# Patient Record
(demographics unavailable — no encounter records)

---

## 2024-11-18 NOTE — CONSULT LETTER
[Dear  ___] : Dear  [unfilled], [Courtesy Letter:] : I had the pleasure of seeing your patient, [unfilled], in my office today. [Please see my note below.] : Please see my note below. [Consult Closing:] : Thank you very much for allowing me to participate in the care of this patient.  If you have any questions, please do not hesitate to contact me. [Sincerely,] : Sincerely, [FreeTextEntry3] : Nas Cartagena MD [facility]

## 2024-11-18 NOTE — HISTORY OF PRESENT ILLNESS
[FreeTextEntry1] : 11/2024 Interval History: Developed a cold end of October and was sent home from school. Seen by PCP and did not have pneumonia. Reportedly had a drop in his oxygen saturation which improved with albuterol. SpO2 was normal at PCP. PCP recommended Flovent every day for one week. - Started Flovent last night for a cough. Recent ER visits/hospitalizations: denies Last oral steroid course: denies Recurrent cough or wheeze: cough some mornings - coughs up mucus Recurrent nocturnal cough or wheeze: few nights per week - described as dry Activity-induced symptoms: denies with pre-treatment with Flovent Daily meds: denies Last used rescue: last week Snores nightly and loud, no witnessed apneas.  8/2024 - initial visit AMBER YORK is a 5 year M presenting for evaluation of asthma. Currently with cough x2-3 days. +congestion. No fevers, no rhinorrhea. Two weeks ago he developed a cough overnight and vomited up mucus and then resolved that night. Previously followed by Dr. Bermudez from Peds Pulmonary at [facility]. Had recurrent cough when he was younger and diagnosed with recurrent croup. [facility] [place] [facility]. Seen in the ER when younger for illness and noted to have fluid in lungs. Currently on Flovent 110 2p BID with spacer with pretty good adherence. [facility] [place] preferred Asmanex over Fluticasone Propionate HFA (generic Flovent).  Prior hospitalizations: denies Courses of oral corticosteroids: 4-5x for respiratory issues Last ER visit: 6 months ago  Respiratory Symptoms Chronic/Persistent daytime cough: denies when well Chronic/Persistent nighttime cough: denies when well Wheezing: yes Albuterol use: 2-3x per week while in school. Currently only using albuterol before soccer camp. Albuterol response: Good Cough and wheezing responsive to oral corticosteroids: yes Activity limitation: +cough, dyspnea   Modified Asthma Predictive Index 4 or more wheezing episodes/year in the first three years of life: Major risk factors   Parental asthma: Dad   History of eczema: yes   Aeroallergen sensitization: +cat, +?grass - seen previously by allergy, will repeat testing when older Minor risk factors   Food allergies: denies   Wheezing apart from colds:   Eosinophilia > 4%:   Respiratory morbidity History of prior RSV infection: denies History of prior COVID-19 infection: denies History of pneumonia: denies History of sinusitis: denies History of recurrent ear infections: denies Family history of CF, PCD, or other diseases of childhood: denies   Other comorbidities DAISY Symptoms: +snoring, no witnessed apneas GERD: No history of spitting up, regurgitation of feeds, back arching, chest discomfort with feeds. No history of medical treatment for reflux. Dysphagia: No history of choking or gagging with feeds.   Birth history: 39 weeks, NICU for monitoring (small for age, temp instability, colic), no respiratory issues Smokers in household: denies Grade: Going into 1st grade Immunizations: UTD

## 2024-11-18 NOTE — CONSULT LETTER
[Dear  ___] : Dear  [unfilled], [Courtesy Letter:] : I had the pleasure of seeing your patient, [unfilled], in my office today. [Please see my note below.] : Please see my note below. [Consult Closing:] : Thank you very much for allowing me to participate in the care of this patient.  If you have any questions, please do not hesitate to contact me. [Sincerely,] : Sincerely, [FreeTextEntry3] : Nas Cartagena MD [facility]

## 2024-11-18 NOTE — REVIEW OF SYSTEMS
[Nl] : Endocrine [Snoring] : snoring [Wheezing] : wheezing [Cough] : cough [Eczema] : eczema [Urticaria] : urticaria [Immunizations are up to date] : Immunizations are up to date [Apnea] : no apnea [Sinus Problems] : no sinus problems [Recurrent Ear Infections] : no recurrent ear infections [Pneumonia] : no pneumonia [de-identified] : see HPI

## 2024-11-18 NOTE — ASSESSMENT
[FreeTextEntry1] : AMBER YORK is a 5yo M with previously diagnosed asthma presenting for follow up evaluation. Risk factors for asthma include FH asthma (father), atopic dermatitis, and concerns for aeroallergen sensitization. Previously noted cough and wheeze responsive to bronchodilators and oral corticosteroids. Previously initiated onto maintenance anti-inflammatory therapy however discontinued. Currently with recurrent viral infections since school started and with recurrent cough, several nights per week and described as dry/nonproductive. History of prior ER visits and several courses of oral corticosteroids, however none since last visit. Reiterated need to restart maintenance therapy and will plan to start budesonide (previously noted to be the cheapest option as opposed to MDI with spacer, preferred by parents). Encouraged early initiation of albuterol as needed for symptoms. Lungs are clear in office with good aeration.  Patient has history of nightly snoring in addition to tonsillar hypertrophy. Concerns for DAISY in father. Will plan for diagnostic sleep study.  The patient has clinical findings consistent with rhinitis and an intranasal steroid and/or an antihistamine may be helpful in controlling symptoms associated with a post-nasal drip and upper airway cough syndrome. I suggested an allergy evaluation to help determine his allergic triggers. Aggressive control of airway inflammation secondary to allergies would help achieve optimal asthma control. Has upcoming allergy testing (immunocap testing) this spring.   Based on the above assessment, my recommendations are as follows: 1. Take 0.50 mg of budesonide via nebulization twice daily. Rinse mouth out afterwards. 2. Start albuterol, 2-4 puffs via spacer (or 1 vial via nebulization) every 4 - 6 hours as needed for cough, wheeze or difficulty breathing. 3. At the first sign of an illness, start albuterol 2-3x per day and increase as needed as per above. 4. Take 2 puffs of albuterol 15-30 minutes before exercise via a spacer +/- mask only as needed.  5. Will plan for a baseline sleep study. 6. Would consider initiation of Singulair if bronchial inflammation/asthma is not well controlled. 7. Consider initiation of OTC antihistamines (i.e., zyrtec, claritin, allegra) +/- flonase 1 spray in each nostril. Stop antihistamines 7 days before allergy testing. 8. Return to clinic in 3 months, or sooner as needed.  Discussed above assessment and management plan. Parent agreed with plan. All queries were answered.

## 2024-11-18 NOTE — REVIEW OF SYSTEMS
[Nl] : Endocrine [Snoring] : snoring [Wheezing] : wheezing [Cough] : cough [Eczema] : eczema [Urticaria] : urticaria [Immunizations are up to date] : Immunizations are up to date [Apnea] : no apnea [Sinus Problems] : no sinus problems [Recurrent Ear Infections] : no recurrent ear infections [Pneumonia] : no pneumonia [de-identified] : see HPI

## 2024-11-18 NOTE — REVIEW OF SYSTEMS
[Nl] : Endocrine [Snoring] : snoring [Wheezing] : wheezing [Cough] : cough [Eczema] : eczema [Urticaria] : urticaria [Immunizations are up to date] : Immunizations are up to date [Apnea] : no apnea [Sinus Problems] : no sinus problems [Recurrent Ear Infections] : no recurrent ear infections [Pneumonia] : no pneumonia [de-identified] : see HPI

## 2024-11-18 NOTE — CONSULT LETTER
[Dear  ___] : Dear  [unfilled], [Courtesy Letter:] : I had the pleasure of seeing your patient, [unfilled], in my office today. [Please see my note below.] : Please see my note below. [Consult Closing:] : Thank you very much for allowing me to participate in the care of this patient.  If you have any questions, please do not hesitate to contact me. [Sincerely,] : Sincerely, [FreeTextEntry3] : Nas Cartagena MD [facility]

## 2024-11-18 NOTE — IMPRESSION
[Spirometry] : Spirometry [FreeTextEntry1] : Unable to interpret due to poor technique/coordination.

## 2024-11-18 NOTE — REVIEW OF SYSTEMS
[Nl] : Endocrine [Snoring] : snoring [Wheezing] : wheezing [Cough] : cough [Eczema] : eczema [Urticaria] : urticaria [Immunizations are up to date] : Immunizations are up to date [Apnea] : no apnea [Sinus Problems] : no sinus problems [Recurrent Ear Infections] : no recurrent ear infections [Pneumonia] : no pneumonia [de-identified] : see HPI

## 2024-11-18 NOTE — HISTORY OF PRESENT ILLNESS
[FreeTextEntry1] : 11/2024 Interval History: Developed a cold end of October and was sent home from school. Seen by PCP and did not have pneumonia. Reportedly had a drop in his oxygen saturation which improved with albuterol. SpO2 was normal at PCP. PCP recommended Flovent every day for one week. - Started Flovent last night for a cough. Recent ER visits/hospitalizations: denies Last oral steroid course: denies Recurrent cough or wheeze: cough some mornings - coughs up mucus Recurrent nocturnal cough or wheeze: few nights per week - described as dry Activity-induced symptoms: denies with pre-treatment with Flovent Daily meds: denies Last used rescue: last week Snores nightly and loud, no witnessed apneas.  8/2024 - initial visit AMBER YORK is a 5 year M presenting for evaluation of asthma. Currently with cough x2-3 days. +congestion. No fevers, no rhinorrhea. Two weeks ago he developed a cough overnight and vomited up mucus and then resolved that night. Previously followed by Dr. Bermudez from Peds Pulmonary at [facility]. Had recurrent cough when he was younger and diagnosed with recurrent croup. [facility] [place] so transitioning to [facility]. Seen in the ER when younger for illness and noted to have fluid in lungs. Currently on Flovent 110 2p BID with spacer with pretty good adherence. [facility] [place] insurance has preferred Asmanex over Fluticasone Propionate HFA (generic Flovent).  Prior hospitalizations: denies Courses of oral corticosteroids: 4-5x for respiratory issues Last ER visit: 6 months ago  Respiratory Symptoms Chronic/Persistent daytime cough: denies when well Chronic/Persistent nighttime cough: denies when well Wheezing: yes Albuterol use: 2-3x per week while in school. Currently only using albuterol before soccer camp. Albuterol response: Good Cough and wheezing responsive to oral corticosteroids: yes Activity limitation: +cough, dyspnea   Modified Asthma Predictive Index 4 or more wheezing episodes/year in the first three years of life: Major risk factors   Parental asthma: Dad   History of eczema: yes   Aeroallergen sensitization: +cat, +?grass - seen previously by allergy, will repeat testing when older Minor risk factors   Food allergies: denies   Wheezing apart from colds:   Eosinophilia > 4%:   Respiratory morbidity History of prior RSV infection: denies History of prior COVID-19 infection: denies History of pneumonia: denies History of sinusitis: denies History of recurrent ear infections: denies Family history of CF, PCD, or other diseases of childhood: denies   Other comorbidities DAISY Symptoms: +snoring, no witnessed apneas GERD: No history of spitting up, regurgitation of feeds, back arching, chest discomfort with feeds. No history of medical treatment for reflux. Dysphagia: No history of choking or gagging with feeds.   Birth history: 39 weeks, NICU for monitoring (small for age, temp instability, colic), no respiratory issues Smokers in household: denies Grade: Going into 1st grade Immunizations: UTD

## 2024-11-18 NOTE — HISTORY OF PRESENT ILLNESS
[FreeTextEntry1] : 11/2024 Interval History: Developed a cold end of October and was sent home from school. Seen by PCP and did not have pneumonia. Reportedly had a drop in his oxygen saturation which improved with albuterol. SpO2 was normal at PCP. PCP recommended Flovent every day for one week. - Started Flovent last night for a cough. Recent ER visits/hospitalizations: denies Last oral steroid course: denies Recurrent cough or wheeze: cough some mornings - coughs up mucus Recurrent nocturnal cough or wheeze: few nights per week - described as dry Activity-induced symptoms: denies with pre-treatment with Flovent Daily meds: denies Last used rescue: last week Snores nightly and loud, no witnessed apneas.  8/2024 - initial visit AMBER YORK is a 5 year M presenting for evaluation of asthma. Currently with cough x2-3 days. +congestion. No fevers, no rhinorrhea. Two weeks ago he developed a cough overnight and vomited up mucus and then resolved that night. Previously followed by Dr. Bermudez from Peds Pulmonary at [facility]. Had recurrent cough when he was younger and diagnosed with recurrent croup. [facility] [place] [facility]. Seen in the ER when younger for illness and noted to have fluid in lungs. Currently on Flovent 110 2p BID with spacer with pretty good adherence. [facility] [place] preferred Asmanex over Fluticasone Propionate HFA (generic Flovent).  Prior hospitalizations: denies Courses of oral corticosteroids: 4-5x for respiratory issues Last ER visit: 6 months ago  Respiratory Symptoms Chronic/Persistent daytime cough: denies when well Chronic/Persistent nighttime cough: denies when well Wheezing: yes Albuterol use: 2-3x per week while in school. Currently only using albuterol before soccer camp. Albuterol response: Good Cough and wheezing responsive to oral corticosteroids: yes Activity limitation: +cough, dyspnea   Modified Asthma Predictive Index 4 or more wheezing episodes/year in the first three years of life: Major risk factors   Parental asthma: Dad   History of eczema: yes   Aeroallergen sensitization: +cat, +?grass - seen previously by allergy, will repeat testing when older Minor risk factors   Food allergies: denies   Wheezing apart from colds:   Eosinophilia > 4%:   Respiratory morbidity History of prior RSV infection: denies History of prior COVID-19 infection: denies History of pneumonia: denies History of sinusitis: denies History of recurrent ear infections: denies Family history of CF, PCD, or other diseases of childhood: denies   Other comorbidities DAISY Symptoms: +snoring, no witnessed apneas GERD: No history of spitting up, regurgitation of feeds, back arching, chest discomfort with feeds. No history of medical treatment for reflux. Dysphagia: No history of choking or gagging with feeds.   Birth history: 39 weeks, NICU for monitoring (small for age, temp instability, colic), no respiratory issues Smokers in household: denies Grade: Going into 1st grade Immunizations: UTD

## 2024-11-18 NOTE — CONSULT LETTER
[Dear  ___] : Dear  [unfilled], [Courtesy Letter:] : I had the pleasure of seeing your patient, [unfilled], in my office today. [Please see my note below.] : Please see my note below. [Consult Closing:] : Thank you very much for allowing me to participate in the care of this patient.  If you have any questions, please do not hesitate to contact me. [Sincerely,] : Sincerely, [FreeTextEntry3] : Nas Cartagena MD [facility]

## 2024-11-18 NOTE — CONSULT LETTER
[Dear  ___] : Dear  [unfilled], [Courtesy Letter:] : I had the pleasure of seeing your patient, [unfilled], in my office today. [Please see my note below.] : Please see my note below. [Consult Closing:] : Thank you very much for allowing me to participate in the care of this patient.  If you have any questions, please do not hesitate to contact me. [Sincerely,] : Sincerely, [FreeTextEntry3] : Nas Cartagena MD [facility]

## 2024-11-18 NOTE — HISTORY OF PRESENT ILLNESS
[FreeTextEntry1] : 11/2024 Interval History: Developed a cold end of October and was sent home from school. Seen by PCP and did not have pneumonia. Reportedly had a drop in his oxygen saturation which improved with albuterol. SpO2 was normal at PCP. PCP recommended Flovent every day for one week. - Started Flovent last night for a cough. Recent ER visits/hospitalizations: denies Last oral steroid course: denies Recurrent cough or wheeze: cough some mornings - coughs up mucus Recurrent nocturnal cough or wheeze: few nights per week - described as dry Activity-induced symptoms: denies with pre-treatment with Flovent Daily meds: denies Last used rescue: last week Snores nightly and loud, no witnessed apneas.  8/2024 - initial visit AMBER YORK is a 5 year M presenting for evaluation of asthma. Currently with cough x2-3 days. +congestion. No fevers, no rhinorrhea. Two weeks ago he developed a cough overnight and vomited up mucus and then resolved that night. Previously followed by Dr. Bermudez from Peds Pulmonary at [facility]. Had recurrent cough when he was younger and diagnosed with recurrent croup. [facility] [place] so [facility]ing to [facility]. Seen in the ER when younger for illness and noted to have fluid in lungs. Currently on Flovent 110 2p BID with spacer with pretty good adherence. [facility] [place] insurance has preferred Asmanex over Fluticasone Propionate HFA (generic Flovent).  Prior hospitalizations: denies Courses of oral corticosteroids: 4-5x for respiratory issues Last ER visit: 6 months ago  Respiratory Symptoms Chronic/Persistent daytime cough: denies when well Chronic/Persistent nighttime cough: denies when well Wheezing: yes Albuterol use: 2-3x per week while in school. Currently only using albuterol before soccer camp. Albuterol response: Good Cough and wheezing responsive to oral corticosteroids: yes Activity limitation: +cough, dyspnea   Modified Asthma Predictive Index 4 or more wheezing episodes/year in the first three years of life: Major risk factors   Parental asthma: Dad   History of eczema: yes   Aeroallergen sensitization: +cat, +?grass - seen previously by allergy, will repeat testing when older Minor risk factors   Food allergies: denies   Wheezing apart from colds:   Eosinophilia > 4%:   Respiratory morbidity History of prior RSV infection: denies History of prior COVID-19 infection: denies History of pneumonia: denies History of sinusitis: denies History of recurrent ear infections: denies Family history of CF, PCD, or other diseases of childhood: denies   Other comorbidities DAISY Symptoms: +snoring, no witnessed apneas GERD: No history of spitting up, regurgitation of feeds, back arching, chest discomfort with feeds. No history of medical treatment for reflux. Dysphagia: No history of choking or gagging with feeds.   Birth history: 39 weeks, NICU for monitoring (small for age, temp instability, colic), no respiratory issues Smokers in household: denies Grade: Going into 1st grade Immunizations: UTD

## 2024-11-18 NOTE — REVIEW OF SYSTEMS
[Nl] : Endocrine [Snoring] : snoring [Wheezing] : wheezing [Cough] : cough [Eczema] : eczema [Urticaria] : urticaria [Immunizations are up to date] : Immunizations are up to date [Apnea] : no apnea [Sinus Problems] : no sinus problems [Recurrent Ear Infections] : no recurrent ear infections [Pneumonia] : no pneumonia [de-identified] : see HPI

## 2024-11-18 NOTE — HISTORY OF PRESENT ILLNESS
[FreeTextEntry1] : 11/2024 Interval History: Developed a cold end of October and was sent home from school. Seen by PCP and did not have pneumonia. Reportedly had a drop in his oxygen saturation which improved with albuterol. SpO2 was normal at PCP. PCP recommended Flovent every day for one week. - Started Flovent last night for a cough. Recent ER visits/hospitalizations: denies Last oral steroid course: denies Recurrent cough or wheeze: cough some mornings - coughs up mucus Recurrent nocturnal cough or wheeze: few nights per week - described as dry Activity-induced symptoms: denies with pre-treatment with Flovent Daily meds: denies Last used rescue: last week Snores nightly and loud, no witnessed apneas.  8/2024 - initial visit AMBER YORK is a 5 year M presenting for evaluation of asthma. Currently with cough x2-3 days. +congestion. No fevers, no rhinorrhea. Two weeks ago he developed a cough overnight and vomited up mucus and then resolved that night. Previously followed by Dr. Bermudez from Peds Pulmonary at [facility]. Had recurrent cough when he was younger and diagnosed with recurrent croup. [facility] [place]urances so transitioning to [facility]. Seen in the ER when younger for illness and noted to have fluid in lungs. Currently on Flovent 110 2p BID with spacer with pretty good adherence. [facility] states insurance has preferred Asmanex over Fluticasone Propionate HFA (generic Flovent).  Prior hospitalizations: denies Courses of oral corticosteroids: 4-5x for respiratory issues Last ER visit: 6 months ago  Respiratory Symptoms Chronic/Persistent daytime cough: denies when well Chronic/Persistent nighttime cough: denies when well Wheezing: yes Albuterol use: 2-3x per week while in school. Currently only using albuterol before soccer camp. Albuterol response: Good Cough and wheezing responsive to oral corticosteroids: yes Activity limitation: +cough, dyspnea   Modified Asthma Predictive Index 4 or more wheezing episodes/year in the first three years of life: Major risk factors   Parental asthma: Dad   History of eczema: yes   Aeroallergen sensitization: +cat, +?grass - seen previously by allergy, will repeat testing when older Minor risk factors   Food allergies: denies   Wheezing apart from colds:   Eosinophilia > 4%:   Respiratory morbidity History of prior RSV infection: denies History of prior COVID-19 infection: denies History of pneumonia: denies History of sinusitis: denies History of recurrent ear infections: denies Family history of CF, PCD, or other diseases of childhood: denies   Other comorbidities DAISY Symptoms: +snoring, no witnessed apneas GERD: No history of spitting up, regurgitation of feeds, back arching, chest discomfort with feeds. No history of medical treatment for reflux. Dysphagia: No history of choking or gagging with feeds.   Birth history: 39 weeks, NICU for monitoring (small for age, temp instability, colic), no respiratory issues Smokers in household: denies Grade: Going into 1st grade Immunizations: UTD

## 2024-11-18 NOTE — PHYSICAL EXAM
[Well Nourished] : well nourished [Well Developed] : well developed [Alert] : ~L alert [Active] : active [Normal Breathing Pattern] : normal breathing pattern [No Respiratory Distress] : no respiratory distress [No Drainage] : no drainage [No Conjunctivitis] : no conjunctivitis [Tympanic Membranes Clear] : tympanic membranes were clear [No Nasal Drainage] : no nasal drainage [No Polyps] : no polyps [No Sinus Tenderness] : no sinus tenderness [No Oral Pallor] : no oral pallor [No Oral Cyanosis] : no oral cyanosis [Non-Erythematous] : non-erythematous [No Exudates] : no exudates [No Postnasal Drip] : no postnasal drip [No Tonsillar Enlargement] : no tonsillar enlargement [Absence Of Retractions] : absence of retractions [Symmetric] : symmetric [Good Expansion] : good expansion [No Acc Muscle Use] : no accessory muscle use [Equal Breath Sounds] : equal breath sounds bilaterally [No Crackles] : no crackles [No Rhonchi] : no rhonchi [No Wheezing] : no wheezing [Normal Sinus Rhythm] : normal sinus rhythm [No Heart Murmur] : no heart murmur [Soft, Non-Tender] : soft, non-tender [Non Distended] : was not ~L distended [Full ROM] : full range of motion [No Clubbing] : no clubbing [Capillary Refill < 2 secs] : capillary refill less than two seconds [No Cyanosis] : no cyanosis [No Kyphoscoliosis] : no kyphoscoliosis [No Contractures] : no contractures [Alert and  Oriented] : alert and oriented [No Abnormal Focal Findings] : no abnormal focal findings [Normal Muscle Tone And Reflexes] : normal muscle tone and reflexes [No Rashes] : no rashes [FreeTextEntry2] : +allergic shiners [FreeTextEntry7] : Mildly diminished aeration to bases [Good aeration to bases] : good aeration to bases [FreeTextEntry3] : external exam normal [FreeTextEntry4] : +edematous nasal turbinates [FreeTextEntry5] : +tonsillar hypertrophy

## 2024-11-18 NOTE — ASSESSMENT
[FreeTextEntry1] : AMBER YORK is a 7yo M with previously diagnosed asthma presenting for follow up evaluation. Risk factors for asthma include FH asthma (father), atopic dermatitis, and concerns for aeroallergen sensitization. Previously noted cough and wheeze responsive to bronchodilators and oral corticosteroids. Previously initiated onto maintenance anti-inflammatory therapy however discontinued. Currently with recurrent viral infections since school started and with recurrent cough, several nights per week and described as dry/nonproductive. History of prior ER visits and several courses of oral corticosteroids, however none since last visit. Reiterated need to restart maintenance therapy and will plan to start budesonide (previously noted to be the cheapest option as opposed to MDI with spacer, preferred by parents). Encouraged early initiation of albuterol as needed for symptoms. Lungs are clear in office with good aeration.  Patient has history of nightly snoring in addition to tonsillar hypertrophy. Concerns for DAISY in father. Will plan for diagnostic sleep study.  The patient has clinical findings consistent with rhinitis and an intranasal steroid and/or an antihistamine may be helpful in controlling symptoms associated with a post-nasal drip and upper airway cough syndrome. I suggested an allergy evaluation to help determine his allergic triggers. Aggressive control of airway inflammation secondary to allergies would help achieve optimal asthma control. Has upcoming allergy testing (immunocap testing) this spring.   Based on the above assessment, my recommendations are as follows: 1. Take 0.50 mg of budesonide via nebulization twice daily. Rinse mouth out afterwards. 2. Start albuterol, 2-4 puffs via spacer (or 1 vial via nebulization) every 4 - 6 hours as needed for cough, wheeze or difficulty breathing. 3. At the first sign of an illness, start albuterol 2-3x per day and increase as needed as per above. 4. Take 2 puffs of albuterol 15-30 minutes before exercise via a spacer +/- mask only as needed.  5. Will plan for a baseline sleep study. 6. Would consider initiation of Singulair if bronchial inflammation/asthma is not well controlled. 7. Consider initiation of OTC antihistamines (i.e., zyrtec, claritin, allegra) +/- flonase 1 spray in each nostril. Stop antihistamines 7 days before allergy testing. 8. Return to clinic in 3 months, or sooner as needed.  Discussed above assessment and management plan. Parent agreed with plan. All queries were answered.

## 2025-02-22 NOTE — REVIEW OF SYSTEMS
[Nl] : Endocrine [Snoring] : snoring [Wheezing] : wheezing [Cough] : cough [Eczema] : eczema [Urticaria] : urticaria [Immunizations are up to date] : Immunizations are up to date [Apnea] : no apnea [Sinus Problems] : no sinus problems [Recurrent Ear Infections] : no recurrent ear infections [Pneumonia] : no pneumonia [de-identified] : see HPI

## 2025-02-22 NOTE — HISTORY OF PRESENT ILLNESS
[FreeTextEntry1] : 2/2025 Interval History: Doing okay. Recent PSG with very mild DAISY - 1.3 events/hr, SpO2 lora 91%, no hypoventilation. +mouth breather? nightly, loud snoring. Reports he constantly sucks his tongue so parents concerned his tongue may be getting in the way. Recent ER visits/hospitalizations: denies Last oral steroid course: denies Recurrent cough or wheeze: denies when well Recurrent nocturnal cough or wheeze: denies when well Activity-induced symptoms: denies Daily meds: budesonide 0.5 BID, albuterol prn Last used rescue: not in a while Will be getting allergy testing in the spring (blood work) - prior history of cat allergy and on zyrtec PRN Going to see a cardiologist next week with brother given FH of murmur.  11/2024 Interval History: Developed a cold end of October and was sent home from school. Seen by PCP and did not have pneumonia. Reportedly had a drop in his oxygen saturation which improved with albuterol. SpO2 was normal at PCP. PCP recommended Flovent every day for one week. - Started Flovent last night for a cough. Recent ER visits/hospitalizations: denies Last oral steroid course: denies Recurrent cough or wheeze: cough some mornings - coughs up mucus Recurrent nocturnal cough or wheeze: few nights per week - described as dry Activity-induced symptoms: denies with pre-treatment with Flovent Daily meds: denies Last used rescue: last week Snores nightly and loud, no witnessed apneas.  8/2024 - initial visit AMBER YORK is a 5 year M presenting for evaluation of asthma. Currently with cough x2-3 days. +congestion. No fevers, no rhinorrhea. Two weeks ago he developed a cough overnight and vomited up mucus and then resolved that night. Previously followed by Dr. Bermudez from Peds Pulmonary at [facility]. Had recurrent cough when he was younger and diagnosed with recurrent croup. [facility] changed insurances so transitioning to [facility]. Seen in the ER when younger for illness and noted to have fluid in lungs. Currently on Flovent 110 2p BID with spacer with pretty good adherence. [facility] states insurance has preferred Asmanex over Fluticasone Propionate HFA (generic Flovent).  Prior hospitalizations: denies Courses of oral corticosteroids: 4-5x for respiratory issues Last ER visit: 6 months ago  Respiratory Symptoms Chronic/Persistent daytime cough: denies when well Chronic/Persistent nighttime cough: denies when well Wheezing: yes Albuterol use: 2-3x per week while in school. Currently only using albuterol before soccer camp. Albuterol response: Good Cough and wheezing responsive to oral corticosteroids: yes Activity limitation: +cough, dyspnea   Modified Asthma Predictive Index 4 or more wheezing episodes/year in the first three years of life: Major risk factors   Parental asthma: Dad   History of eczema: yes   Aeroallergen sensitization: +cat, +?grass - seen previously by allergy, will repeat testing when older Minor risk factors   Food allergies: denies   Wheezing apart from colds:   Eosinophilia > 4%:   Respiratory morbidity History of prior RSV infection: denies History of prior COVID-19 infection: denies History of pneumonia: denies History of sinusitis: denies History of recurrent ear infections: denies Family history of CF, PCD, or other diseases of childhood: denies   Other comorbidities DAISY Symptoms: +snoring, no witnessed apneas GERD: No history of spitting up, regurgitation of feeds, back arching, chest discomfort with feeds. No history of medical treatment for reflux. Dysphagia: No history of choking or gagging with feeds.   Birth history: 39 weeks, NICU for monitoring (small for age, temp instability, colic), no respiratory issues Smokers in household: denies Grade: Going into 1st grade Immunizations: UTD

## 2025-02-22 NOTE — SOCIAL HISTORY
Order CBC, CMP, lipids, TSH (med monitor). Fasting.   [Smokers in Household] : there are no smokers in the home

## 2025-02-22 NOTE — HISTORY OF PRESENT ILLNESS
[FreeTextEntry1] : 2/2025 Interval History: Doing okay. Recent PSG with very mild DAISY - 1.3 events/hr, SpO2 lora 91%, no hypoventilation. +mouth breather? nightly, loud snoring. Reports he constantly sucks his tongue so parents concerned his tongue may be getting in the way. Recent ER visits/hospitalizations: denies Last oral steroid course: denies Recurrent cough or wheeze: denies when well Recurrent nocturnal cough or wheeze: denies when well Activity-induced symptoms: denies Daily meds: budesonide 0.5 BID, albuterol prn Last used rescue: not in a while Will be getting allergy testing in the spring (blood work) - prior history of cat allergy and on zyrtec PRN Going to see a cardiologist next week with brother given FH of murmur.  11/2024 Interval History: Developed a cold end of October and was sent home from school. Seen by PCP and did not have pneumonia. Reportedly had a drop in his oxygen saturation which improved with albuterol. SpO2 was normal at PCP. PCP recommended Flovent every day for one week. - Started Flovent last night for a cough. Recent ER visits/hospitalizations: denies Last oral steroid course: denies Recurrent cough or wheeze: cough some mornings - coughs up mucus Recurrent nocturnal cough or wheeze: few nights per week - described as dry Activity-induced symptoms: denies with pre-treatment with Flovent Daily meds: denies Last used rescue: last week Snores nightly and loud, no witnessed apneas.  8/2024 - initial visit AMBER YORK is a 5 year M presenting for evaluation of asthma. Currently with cough x2-3 days. +congestion. No fevers, no rhinorrhea. Two weeks ago he developed a cough overnight and vomited up mucus and then resolved that night. Previously followed by Dr. Bermudez from Peds Pulmonary at [facility]. Had recurrent cough when he was younger and diagnosed with recurrent croup. [facility] changed insurances so transitioning to [facility]. Seen in the ER when younger for illness and noted to have fluid in lungs. Currently on Flovent 110 2p BID with spacer with pretty good adherence. [facility] states insurance has preferred Asmanex over Fluticasone Propionate HFA (generic Flovent).  Prior hospitalizations: denies Courses of oral corticosteroids: 4-5x for respiratory issues Last ER visit: 6 months ago  Respiratory Symptoms Chronic/Persistent daytime cough: denies when well Chronic/Persistent nighttime cough: denies when well Wheezing: yes Albuterol use: 2-3x per week while in school. Currently only using albuterol before soccer camp. Albuterol response: Good Cough and wheezing responsive to oral corticosteroids: yes Activity limitation: +cough, dyspnea   Modified Asthma Predictive Index 4 or more wheezing episodes/year in the first three years of life: Major risk factors   Parental asthma: Dad   History of eczema: yes   Aeroallergen sensitization: +cat, +?grass - seen previously by allergy, will repeat testing when older Minor risk factors   Food allergies: denies   Wheezing apart from colds:   Eosinophilia > 4%:   Respiratory morbidity History of prior RSV infection: denies History of prior COVID-19 infection: denies History of pneumonia: denies History of sinusitis: denies History of recurrent ear infections: denies Family history of CF, PCD, or other diseases of childhood: denies   Other comorbidities DAISY Symptoms: +snoring, no witnessed apneas GERD: No history of spitting up, regurgitation of feeds, back arching, chest discomfort with feeds. No history of medical treatment for reflux. Dysphagia: No history of choking or gagging with feeds.   Birth history: 39 weeks, NICU for monitoring (small for age, temp instability, colic), no respiratory issues Smokers in household: denies Grade: Going into 1st grade Immunizations: UTD

## 2025-02-22 NOTE — ASSESSMENT
[FreeTextEntry1] : AMBER YORK is a 7yo M with previously diagnosed asthma presenting for follow up evaluation. Risk factors for asthma include FH asthma (father), atopic dermatitis, and concerns for aeroallergen sensitization. Previously noted cough and wheeze responsive to bronchodilators and oral corticosteroids. Doing overall well since last visit without recurrent cough or wheeze. History of prior ER visits and several courses of oral corticosteroids, however none since last visit. Currently on budesonide twice daily (previously noted to be the cheapest option as opposed to MDI with spacer, preferred by parents) and tolerating well. Encouraged early initiation of albuterol as needed for symptoms with illness. Lungs are clear in office with good aeration. Spirometry not performed. Will continue budesonide through the remainder of the winter/viral season and re-evaluate ability to discontinue at next visit.  Patient has history of nightly snoring in addition to tonsillar hypertrophy. Concerns for DAISY in father. Recent sleep study with very mild DAISY (1.3 events/hr, SpO2 lora 91%, no hypoventilation). Parents report nightly, loud snoring. Reports he constantly sucks his tongue so parents concerned his tongue may be getting in the way. Discussed with sleep medicine team and too young to consider dental appliances. Would suggest ENT evaluation at this time for upper airway evaluation given ongoing symptoms.  The patient has clinical findings consistent with rhinitis and an intranasal steroid and/or an antihistamine may be helpful in controlling symptoms associated with a post-nasal drip and upper airway cough syndrome. I suggested an allergy evaluation to help determine his allergic triggers. Aggressive control of airway inflammation secondary to allergies would help achieve optimal asthma control. Has upcoming allergy testing (immunocap testing) this spring.   Based on the above assessment, my recommendations are as follows: 1. Take 0.50 mg of budesonide via nebulization twice daily. Rinse mouth out afterwards. 2. Start albuterol, 2-4 puffs via spacer (or 1 vial via nebulization) every 4 - 6 hours as needed for cough, wheeze or difficulty breathing. 3. At the first sign of an illness, start albuterol 2-3x per day and increase as needed as per above. 4. Take 2 puffs of albuterol 15-30 minutes before exercise via a spacer +/- mask only as needed.  5. Start flonase 1 spray in each nostril once daily at night. 6. Should see an ENT for evaluation of upper airway obstruction. Some ENTs in the area include Dr. James Springer and Dr. Star Lainez ([facility]). Please call [phone] to schedule an appointment. 7. Will discuss with sleep specialist - too young for dental appliance. 8. Return to clinic in 4-5 months, or sooner as needed.  Discussed above assessment and management plan. Parent agreed with plan. All queries were answered.

## 2025-02-22 NOTE — ASSESSMENT
[FreeTextEntry1] : AMBER YORK is a 7yo M with previously diagnosed asthma presenting for follow up evaluation. Risk factors for asthma include FH asthma (father), atopic dermatitis, and concerns for aeroallergen sensitization. Previously noted cough and wheeze responsive to bronchodilators and oral corticosteroids. Doing overall well since last visit without recurrent cough or wheeze. History of prior ER visits and several courses of oral corticosteroids, however none since last visit. Currently on budesonide twice daily (previously noted to be the cheapest option as opposed to MDI with spacer, preferred by parents) and tolerating well. Encouraged early initiation of albuterol as needed for symptoms with illness. Lungs are clear in office with good aeration. Spirometry not performed. Will continue budesonide through the remainder of the winter/viral season and re-evaluate ability to discontinue at next visit.  Patient has history of nightly snoring in addition to tonsillar hypertrophy. Concerns for DAISY in father. Recent sleep study with very mild DAISY (1.3 events/hr, SpO2 lora 91%, no hypoventilation). Parents report nightly, loud snoring. Reports he constantly sucks his tongue so parents concerned his tongue may be getting in the way. Discussed with sleep medicine team and too young to consider dental appliances. Would suggest ENT evaluation at this time for upper airway evaluation given ongoing symptoms.  The patient has clinical findings consistent with rhinitis and an intranasal steroid and/or an antihistamine may be helpful in controlling symptoms associated with a post-nasal drip and upper airway cough syndrome. I suggested an allergy evaluation to help determine his allergic triggers. Aggressive control of airway inflammation secondary to allergies would help achieve optimal asthma control. Has upcoming allergy testing (immunocap testing) this spring.   Based on the above assessment, my recommendations are as follows: 1. Take 0.50 mg of budesonide via nebulization twice daily. Rinse mouth out afterwards. 2. Start albuterol, 2-4 puffs via spacer (or 1 vial via nebulization) every 4 - 6 hours as needed for cough, wheeze or difficulty breathing. 3. At the first sign of an illness, start albuterol 2-3x per day and increase as needed as per above. 4. Take 2 puffs of albuterol 15-30 minutes before exercise via a spacer +/- mask only as needed.  5. Start flonase 1 spray in each nostril once daily at night. 6. Should see an ENT for evaluation of upper airway obstruction. Some ENTs in the area include Dr. James Springer and Dr. Star Lainez ([facility]). Please call [phone] to schedule an appointment. 7. Will discuss with sleep specialist - too young for dental appliance. 8. Return to clinic in 4-5 months, or sooner as needed.  Discussed above assessment and management plan. Parent agreed with plan. All queries were answered.

## 2025-02-22 NOTE — CONSULT LETTER
[Dear  ___] : Dear  [unfilled], [Courtesy Letter:] : I had the pleasure of seeing your patient, [unfilled], in my office today. [Please see my note below.] : Please see my note below. [Consult Closing:] : Thank you very much for allowing me to participate in the care of this patient.  If you have any questions, please do not hesitate to contact me. [Sincerely,] : Sincerely, [FreeTextEntry3] : Nas Cartagena MD [facility]

## 2025-02-22 NOTE — REVIEW OF SYSTEMS
[Nl] : Endocrine [Snoring] : snoring [Wheezing] : wheezing [Cough] : cough [Eczema] : eczema [Urticaria] : urticaria [Immunizations are up to date] : Immunizations are up to date [Apnea] : no apnea [Sinus Problems] : no sinus problems [Recurrent Ear Infections] : no recurrent ear infections [Pneumonia] : no pneumonia [de-identified] : see HPI

## 2025-02-22 NOTE — ASSESSMENT
[FreeTextEntry1] : AMBER YORK is a 5yo M with previously diagnosed asthma presenting for follow up evaluation. Risk factors for asthma include FH asthma (father), atopic dermatitis, and concerns for aeroallergen sensitization. Previously noted cough and wheeze responsive to bronchodilators and oral corticosteroids. Doing overall well since last visit without recurrent cough or wheeze. History of prior ER visits and several courses of oral corticosteroids, however none since last visit. Currently on budesonide twice daily (previously noted to be the cheapest option as opposed to MDI with spacer, preferred by parents) and tolerating well. Encouraged early initiation of albuterol as needed for symptoms with illness. Lungs are clear in office with good aeration. Spirometry not performed. Will continue budesonide through the remainder of the winter/viral season and re-evaluate ability to discontinue at next visit.  Patient has history of nightly snoring in addition to tonsillar hypertrophy. Concerns for DAISY in father. Recent sleep study with very mild DAISY (1.3 events/hr, SpO2 lora 91%, no hypoventilation). Parents report nightly, loud snoring. Reports he constantly sucks his tongue so parents concerned his tongue may be getting in the way. Discussed with sleep medicine team and too young to consider dental appliances. Would suggest ENT evaluation at this time for upper airway evaluation given ongoing symptoms.  The patient has clinical findings consistent with rhinitis and an intranasal steroid and/or an antihistamine may be helpful in controlling symptoms associated with a post-nasal drip and upper airway cough syndrome. I suggested an allergy evaluation to help determine his allergic triggers. Aggressive control of airway inflammation secondary to allergies would help achieve optimal asthma control. Has upcoming allergy testing (immunocap testing) this spring.   Based on the above assessment, my recommendations are as follows: 1. Take 0.50 mg of budesonide via nebulization twice daily. Rinse mouth out afterwards. 2. Start albuterol, 2-4 puffs via spacer (or 1 vial via nebulization) every 4 - 6 hours as needed for cough, wheeze or difficulty breathing. 3. At the first sign of an illness, start albuterol 2-3x per day and increase as needed as per above. 4. Take 2 puffs of albuterol 15-30 minutes before exercise via a spacer +/- mask only as needed.  5. Start flonase 1 spray in each nostril once daily at night. 6. Should see an ENT for evaluation of upper airway obstruction. Some ENTs in the area include Dr. James Springer and Dr. Star Lainez ([facility]). Please call [phone] to schedule an appointment. 7. Will discuss with sleep specialist - too young for dental appliance. 8. Return to clinic in 4-5 months, or sooner as needed.  Discussed above assessment and management plan. Parent agreed with plan. All queries were answered.

## 2025-02-22 NOTE — HISTORY OF PRESENT ILLNESS
[FreeTextEntry1] : 2/2025 Interval History: Doing okay. Recent PSG with very mild DAISY - 1.3 events/hr, SpO2 lora 91%, no hypoventilation. +mouth breather? nightly, loud snoring. Reports he constantly sucks his tongue so parents concerned his tongue may be getting in the way. Recent ER visits/hospitalizations: denies Last oral steroid course: denies Recurrent cough or wheeze: denies when well Recurrent nocturnal cough or wheeze: denies when well Activity-induced symptoms: denies Daily meds: budesonide 0.5 BID, albuterol prn Last used rescue: not in a while Will be getting allergy testing in the spring (blood work) - prior history of cat allergy and on zyrtec PRN Going to see a cardiologist next week with brother given FH of murmur.  11/2024 Interval History: Developed a cold end of October and was sent home from school. Seen by PCP and did not have pneumonia. Reportedly had a drop in his oxygen saturation which improved with albuterol. SpO2 was normal at PCP. PCP recommended Flovent every day for one week. - Started Flovent last night for a cough. Recent ER visits/hospitalizations: denies Last oral steroid course: denies Recurrent cough or wheeze: cough some mornings - coughs up mucus Recurrent nocturnal cough or wheeze: few nights per week - described as dry Activity-induced symptoms: denies with pre-treatment with Flovent Daily meds: denies Last used rescue: last week Snores nightly and loud, no witnessed apneas.  8/2024 - initial visit AMBER YORK is a 5 year M presenting for evaluation of asthma. Currently with cough x2-3 days. +congestion. No fevers, no rhinorrhea. Two weeks ago he developed a cough overnight and vomited up mucus and then resolved that night. Previously followed by Dr. Bermudez from Peds Pulmonary at [facility]. Had recurrent cough when he was younger and diagnosed with recurrent croup. [facility] changed insurances so transitioning to [facility]. Seen in the ER when younger for illness and noted to have fluid in lungs. Currently on Flovent 110 2p BID with spacer with pretty good adherence. [facility] states insurance has preferred Asmanex over Fluticasone Propionate HFA (generic Flovent).  Prior hospitalizations: denies Courses of oral corticosteroids: 4-5x for respiratory issues Last ER visit: 6 months ago  Respiratory Symptoms Chronic/Persistent daytime cough: denies when well Chronic/Persistent nighttime cough: denies when well Wheezing: yes Albuterol use: 2-3x per week while in school. Currently only using albuterol before soccer camp. Albuterol response: Good Cough and wheezing responsive to oral corticosteroids: yes Activity limitation: +cough, dyspnea   Modified Asthma Predictive Index 4 or more wheezing episodes/year in the first three years of life: Major risk factors   Parental asthma: Dad   History of eczema: yes   Aeroallergen sensitization: +cat, +?grass - seen previously by allergy, will repeat testing when older Minor risk factors   Food allergies: denies   Wheezing apart from colds:   Eosinophilia > 4%:   Respiratory morbidity History of prior RSV infection: denies History of prior COVID-19 infection: denies History of pneumonia: denies History of sinusitis: denies History of recurrent ear infections: denies Family history of CF, PCD, or other diseases of childhood: denies   Other comorbidities DAISY Symptoms: +snoring, no witnessed apneas GERD: No history of spitting up, regurgitation of feeds, back arching, chest discomfort with feeds. No history of medical treatment for reflux. Dysphagia: No history of choking or gagging with feeds.   Birth history: 39 weeks, NICU for monitoring (small for age, temp instability, colic), no respiratory issues Smokers in household: denies Grade: Going into 1st grade Immunizations: UTD

## 2025-02-22 NOTE — CONSULT LETTER
[Dear  ___] : Dear  [unfilled], [Courtesy Letter:] : I had the pleasure of seeing your patient, [unfilled], in my office today. [Please see my note below.] : Please see my note below. [Consult Closing:] : Thank you very much for allowing me to participate in the care of this patient.  If you have any questions, please do not hesitate to contact me. [Sincerely,] : Sincerely, [FreeTextEntry3] : Nas Cartagena MD [facility]

## 2025-02-22 NOTE — REVIEW OF SYSTEMS
[Nl] : Endocrine [Snoring] : snoring [Wheezing] : wheezing [Cough] : cough [Eczema] : eczema [Urticaria] : urticaria [Immunizations are up to date] : Immunizations are up to date [Apnea] : no apnea [Sinus Problems] : no sinus problems [Recurrent Ear Infections] : no recurrent ear infections [Pneumonia] : no pneumonia [de-identified] : see HPI

## 2025-02-22 NOTE — CONSULT LETTER
[Dear  ___] : Dear  [unfilled], [Courtesy Letter:] : I had the pleasure of seeing your patient, [unfilled], in my office today. [Please see my note below.] : Please see my note below. [Consult Closing:] : Thank you very much for allowing me to participate in the care of this patient.  If you have any questions, please do not hesitate to contact me. [Sincerely,] : Sincerely, [FreeTextEntry3] : Nas Cartagena MD [facility]

## 2025-02-22 NOTE — ASSESSMENT
[FreeTextEntry1] : AMBER YORK is a 5yo M with previously diagnosed asthma presenting for follow up evaluation. Risk factors for asthma include FH asthma (father), atopic dermatitis, and concerns for aeroallergen sensitization. Previously noted cough and wheeze responsive to bronchodilators and oral corticosteroids. Doing overall well since last visit without recurrent cough or wheeze. History of prior ER visits and several courses of oral corticosteroids, however none since last visit. Currently on budesonide twice daily (previously noted to be the cheapest option as opposed to MDI with spacer, preferred by parents) and tolerating well. Encouraged early initiation of albuterol as needed for symptoms with illness. Lungs are clear in office with good aeration. Spirometry not performed. Will continue budesonide through the remainder of the winter/viral season and re-evaluate ability to discontinue at next visit.  Patient has history of nightly snoring in addition to tonsillar hypertrophy. Concerns for DAISY in father. Recent sleep study with very mild DAISY (1.3 events/hr, SpO2 lora 91%, no hypoventilation). Parents report nightly, loud snoring. Reports he constantly sucks his tongue so parents concerned his tongue may be getting in the way. Discussed with sleep medicine team and too young to consider dental appliances. Would suggest ENT evaluation at this time for upper airway evaluation given ongoing symptoms.  The patient has clinical findings consistent with rhinitis and an intranasal steroid and/or an antihistamine may be helpful in controlling symptoms associated with a post-nasal drip and upper airway cough syndrome. I suggested an allergy evaluation to help determine his allergic triggers. Aggressive control of airway inflammation secondary to allergies would help achieve optimal asthma control. Has upcoming allergy testing (immunocap testing) this spring.   Based on the above assessment, my recommendations are as follows: 1. Take 0.50 mg of budesonide via nebulization twice daily. Rinse mouth out afterwards. 2. Start albuterol, 2-4 puffs via spacer (or 1 vial via nebulization) every 4 - 6 hours as needed for cough, wheeze or difficulty breathing. 3. At the first sign of an illness, start albuterol 2-3x per day and increase as needed as per above. 4. Take 2 puffs of albuterol 15-30 minutes before exercise via a spacer +/- mask only as needed.  5. Start flonase 1 spray in each nostril once daily at night. 6. Should see an ENT for evaluation of upper airway obstruction. Some ENTs in the area include Dr. James Springer and Dr. Star Lainez ([facility]). Please call [phone] to schedule an appointment. 7. Will discuss with sleep specialist - too young for dental appliance. 8. Return to clinic in 4-5 months, or sooner as needed.  Discussed above assessment and management plan. Parent agreed with plan. All queries were answered.

## 2025-02-22 NOTE — HISTORY OF PRESENT ILLNESS
[FreeTextEntry1] : 2/2025 Interval History: Doing okay. Recent PSG with very mild DAISY - 1.3 events/hr, SpO2 lora 91%, no hypoventilation. +mouth breather? nightly, loud snoring. Reports he constantly sucks his tongue so parents concerned his tongue may be getting in the way. Recent ER visits/hospitalizations: denies Last oral steroid course: denies Recurrent cough or wheeze: denies when well Recurrent nocturnal cough or wheeze: denies when well Activity-induced symptoms: denies Daily meds: budesonide 0.5 BID, albuterol prn Last used rescue: not in a while Will be getting allergy testing in the spring (blood work) - prior history of cat allergy and on zyrtec PRN Going to see a cardiologist next week with brother given FH of murmur.  11/2024 Interval History: Developed a cold end of October and was sent home from school. Seen by PCP and did not have pneumonia. Reportedly had a drop in his oxygen saturation which improved with albuterol. SpO2 was normal at PCP. PCP recommended Flovent every day for one week. - Started Flovent last night for a cough. Recent ER visits/hospitalizations: denies Last oral steroid course: denies Recurrent cough or wheeze: cough some mornings - coughs up mucus Recurrent nocturnal cough or wheeze: few nights per week - described as dry Activity-induced symptoms: denies with pre-treatment with Flovent Daily meds: denies Last used rescue: last week Snores nightly and loud, no witnessed apneas.  8/2024 - initial visit AMBER YORK is a 5 year M presenting for evaluation of asthma. Currently with cough x2-3 days. +congestion. No fevers, no rhinorrhea. Two weeks ago he developed a cough overnight and vomited up mucus and then resolved that night. Previously followed by Dr. Bermudez from Peds Pulmonary at [facility]. Had recurrent cough when he was younger and diagnosed with recurrent croup. [facility] changed insurances so transitioning to [facility]. Seen in the ER when younger for illness and noted to have fluid in lungs. Currently on Flovent 110 2p BID with spacer with pretty good adherence. [facility] states insurance has preferred Asmanex over Fluticasone Propionate HFA (generic Flovent).  Prior hospitalizations: denies Courses of oral corticosteroids: 4-5x for respiratory issues Last ER visit: 6 months ago  Respiratory Symptoms Chronic/Persistent daytime cough: denies when well Chronic/Persistent nighttime cough: denies when well Wheezing: yes Albuterol use: 2-3x per week while in school. Currently only using albuterol before soccer camp. Albuterol response: Good Cough and wheezing responsive to oral corticosteroids: yes Activity limitation: +cough, dyspnea   Modified Asthma Predictive Index 4 or more wheezing episodes/year in the first three years of life: Major risk factors   Parental asthma: Dad   History of eczema: yes   Aeroallergen sensitization: +cat, +?grass - seen previously by allergy, will repeat testing when older Minor risk factors   Food allergies: denies   Wheezing apart from colds:   Eosinophilia > 4%:   Respiratory morbidity History of prior RSV infection: denies History of prior COVID-19 infection: denies History of pneumonia: denies History of sinusitis: denies History of recurrent ear infections: denies Family history of CF, PCD, or other diseases of childhood: denies   Other comorbidities DAISY Symptoms: +snoring, no witnessed apneas GERD: No history of spitting up, regurgitation of feeds, back arching, chest discomfort with feeds. No history of medical treatment for reflux. Dysphagia: No history of choking or gagging with feeds.   Birth history: 39 weeks, NICU for monitoring (small for age, temp instability, colic), no respiratory issues Smokers in household: denies Grade: Going into 1st grade Immunizations: UTD

## 2025-02-22 NOTE — REVIEW OF SYSTEMS
[Nl] : Endocrine [Snoring] : snoring [Wheezing] : wheezing [Cough] : cough [Eczema] : eczema [Urticaria] : urticaria [Immunizations are up to date] : Immunizations are up to date [Apnea] : no apnea [Sinus Problems] : no sinus problems [Recurrent Ear Infections] : no recurrent ear infections [Pneumonia] : no pneumonia [de-identified] : see HPI

## 2025-02-22 NOTE — CONSULT LETTER
[Dear  ___] : Dear  [unfilled], [Courtesy Letter:] : I had the pleasure of seeing your patient, [unfilled], in my office today. [Please see my note below.] : Please see my note below. [Consult Closing:] : Thank you very much for allowing me to participate in the care of this patient.  If you have any questions, please do not hesitate to contact me. [Sincerely,] : Sincerely, [FreeTextEntry3] : Nas Cartagena MD [facility]

## 2025-02-22 NOTE — PHYSICAL EXAM
[Well Nourished] : well nourished [Well Developed] : well developed [Alert] : ~L alert [Active] : active [Normal Breathing Pattern] : normal breathing pattern [No Respiratory Distress] : no respiratory distress [No Drainage] : no drainage [No Conjunctivitis] : no conjunctivitis [No Nasal Drainage] : no nasal drainage [No Polyps] : no polyps [No Sinus Tenderness] : no sinus tenderness [No Oral Pallor] : no oral pallor [No Oral Cyanosis] : no oral cyanosis [Non-Erythematous] : non-erythematous [No Exudates] : no exudates [No Postnasal Drip] : no postnasal drip [Absence Of Retractions] : absence of retractions [Symmetric] : symmetric [Good Expansion] : good expansion [No Acc Muscle Use] : no accessory muscle use [Good aeration to bases] : good aeration to bases [Equal Breath Sounds] : equal breath sounds bilaterally [No Crackles] : no crackles [No Rhonchi] : no rhonchi [No Wheezing] : no wheezing [Normal Sinus Rhythm] : normal sinus rhythm [No Heart Murmur] : no heart murmur [Soft, Non-Tender] : soft, non-tender [Non Distended] : was not ~L distended [Full ROM] : full range of motion [No Clubbing] : no clubbing [Capillary Refill < 2 secs] : capillary refill less than two seconds [No Cyanosis] : no cyanosis [No Kyphoscoliosis] : no kyphoscoliosis [No Contractures] : no contractures [Alert and  Oriented] : alert and oriented [No Abnormal Focal Findings] : no abnormal focal findings [Normal Muscle Tone And Reflexes] : normal muscle tone and reflexes [No Rashes] : no rashes [No Allergic Shiners] : no allergic shiners [FreeTextEntry3] : external exam normal [FreeTextEntry4] : +edematous nasal turbinates [FreeTextEntry5] : +mild tonsillar hypertrophy

## 2025-02-26 NOTE — CARDIOLOGY SUMMARY
[Today's Date] : [unfilled] [FreeTextEntry1] : An electrocardiogram performed on the patient on account of the family history reveals a normal sinus rhythm with a normal axis.  There is no evidence for chamber enlargement or hypertrophy.

## 2025-02-26 NOTE — PHYSICAL EXAM
[General Appearance - Alert] : alert [General Appearance - In No Acute Distress] : in no acute distress [General Appearance - Well Nourished] : well nourished [General Appearance - Well Developed] : well developed [General Appearance - Well-Appearing] : well appearing [Attitude Uncooperative] : cooperative [Facies] : there were no dysmorphic facial features [Sclera] : the conjunctiva were normal [Examination Of The Oral Cavity] : mucous membranes were moist and pink [No Cough] : no cough [Auscultation Breath Sounds / Voice Sounds] : breath sounds clear to auscultation bilaterally [Stridor] : no stridor was observed [Respiration, Rhythm And Depth] : normal respiratory rhythm and effort [Normal Chest Appearance] : the chest was normal in appearance [Chest Visual Inspection Thoracic Deformity] : no chest wall deformity [Apical Impulse] : quiet precordium with normal apical impulse [Heart Rate And Rhythm] : normal heart rate and rhythm [Heart Sounds] : normal S1 and S2 [No Murmur] : no murmurs  [Heart Sounds Gallop] : no gallops [Heart Sounds Pericardial Friction Rub] : no pericardial rub [Edema] : no edema [Arterial Pulses] : normal upper and lower extremity pulses with no pulse delay [Heart Sounds Click] : no clicks [Capillary Refill Test] : normal capillary refill [Abdomen Soft] : soft [] : no hepato-splenomegaly [Nail Clubbing] : no clubbing  or cyanosis of the fingernails [Abnormal Walk] : normal gait [Skin Lesions] : no lesions [Demonstrated Behavior - Infant Nonreactive To Parents] : interactive

## 2025-02-26 NOTE — REASON FOR VISIT
[Initial Consultation] : an initial consultation for [FreeTextEntry3] : Positive truly in the short IN interval family history of heart disease in grandmother [Patient] : patient [Father] : father

## 2025-02-26 NOTE — CONSULT LETTER
[Today's Date] : [unfilled] [Name] : Name: [unfilled] [] : : ~~ [Today's Date:] : [unfilled] [Dear  ___:] : Dear Dr. [unfilled]: [Consult] : I had the pleasure of evaluating your patient, [unfilled]. My full evaluation follows. [Consult - Single Provider] : Thank you very much for allowing me to participate in the care of this patient. If you have any questions, please do not hesitate to contact me. [Sincerely,] : Sincerely, [FreeTextEntry4] : Dr. Jennifer Villa [de-identified] : Chari Vera MD MSc Pediatric Cardiologist [facility]

## 2025-07-10 NOTE — HISTORY OF PRESENT ILLNESS
[de-identified] : Hx allergies, RAD, follows with pulm/Allergy. Is here for mild snoring, no fatigue/apneas. Good weight gain. No hx otitis. Was given fluticasone spray daily but does not use consistently. Dad defers FFL.

## 2025-07-10 NOTE — ASSESSMENT
[FreeTextEntry1] : Snoring: ctm, RTC if sxs worsen   Allergic rhinitis: Rec nasal sprays including saline, Flonase and Azelastine. f/u allergy  RAD: f/u pulmonology

## 2025-07-20 NOTE — PHYSICAL EXAM
[Well Nourished] : well nourished [Well Developed] : well developed [Alert] : ~L alert [Active] : active [Normal Breathing Pattern] : normal breathing pattern [No Respiratory Distress] : no respiratory distress [No Allergic Shiners] : no allergic shiners [No Drainage] : no drainage [No Conjunctivitis] : no conjunctivitis [No Nasal Drainage] : no nasal drainage [No Polyps] : no polyps [No Sinus Tenderness] : no sinus tenderness [No Oral Pallor] : no oral pallor [No Oral Cyanosis] : no oral cyanosis [Non-Erythematous] : non-erythematous [No Exudates] : no exudates [No Postnasal Drip] : no postnasal drip [Absence Of Retractions] : absence of retractions [Symmetric] : symmetric [Good Expansion] : good expansion [No Acc Muscle Use] : no accessory muscle use [Good aeration to bases] : good aeration to bases [Equal Breath Sounds] : equal breath sounds bilaterally [No Crackles] : no crackles [No Rhonchi] : no rhonchi [No Wheezing] : no wheezing [Normal Sinus Rhythm] : normal sinus rhythm [No Heart Murmur] : no heart murmur [Soft, Non-Tender] : soft, non-tender [Non Distended] : was not ~L distended [Full ROM] : full range of motion [No Clubbing] : no clubbing [Capillary Refill < 2 secs] : capillary refill less than two seconds [No Cyanosis] : no cyanosis [No Kyphoscoliosis] : no kyphoscoliosis [No Contractures] : no contractures [Alert and  Oriented] : alert and oriented [No Abnormal Focal Findings] : no abnormal focal findings [Normal Muscle Tone And Reflexes] : normal muscle tone and reflexes [No Rashes] : no rashes [FreeTextEntry3] : external exam normal [FreeTextEntry4] : +edematous nasal turbinates [FreeTextEntry5] : +mild tonsillar hypertrophy

## 2025-07-20 NOTE — CONSULT LETTER
[Dear  ___] : Dear  [unfilled], [Courtesy Letter:] : I had the pleasure of seeing your patient, [unfilled], in my office today. [Please see my note below.] : Please see my note below. [Consult Closing:] : Thank you very much for allowing me to participate in the care of this patient.  If you have any questions, please do not hesitate to contact me. [Sincerely,] : Sincerely, [FreeTextEntry3] : Nas Cartagena MD [facility]

## 2025-07-20 NOTE — IMPRESSION
[Spirometry] : Spirometry [FreeTextEntry1] : Spirometry demonstrates an FVC of 92%, FEV1 of 83%, FEV1/FVC ratio of 81, and an [id number] of 70%. FEFmax 90%.

## 2025-07-20 NOTE — CONSULT LETTER
[Dear  ___] : Dear  [unfilled], [Courtesy Letter:] : I had the pleasure of seeing your patient, [unfilled], in my office today. [Please see my note below.] : Please see my note below. [Consult Closing:] : Thank you very much for allowing me to participate in the care of this patient.  If you have any questions, please do not hesitate to contact me. [Sincerely,] : Sincerely, [FreeTextEntry3] : Nas Cartagena MD [facility]

## 2025-07-20 NOTE — ASSESSMENT
[FreeTextEntry1] : AMBER YORK is a 5yo M with previously diagnosed asthma presenting for follow up evaluation. Risk factors for asthma include FH asthma (father), atopic dermatitis, and concerns for aeroallergen sensitization. Previously noted cough and wheeze responsive to bronchodilators and oral corticosteroids. Doing overall well since last visit without recurrent cough or wheeze. History of prior ER visits and several courses of oral corticosteroids, however none since last visit. Currently on budesonide twice daily (previously noted to be the cheapest option as opposed to MDI with spacer, preferred by parents) and tolerating well. Encouraged early initiation of albuterol as needed for symptoms with illness. Lungs are clear in office with good aeration. Spirometry performed and overall normal although with low-normal FEV1/FVC ratio and stepdown decrease in flows. Will continue budesonide at this time - tried to obtain MDI however not covered by insurance.  Patient has history of nightly snoring in addition to tonsillar hypertrophy. Concerns for DAISY in father. Recent sleep study with very mild DAISY (1.3 events/hr, SpO2 lora 91%, no hypoventilation). Parents report loud snoring when sick, improved when well; no witnessed apneas. Reports he constantly sucks his tongue so parents concerned his tongue may be getting in the way. Seeing PT for this. Discussed with sleep medicine team and too young to consider dental appliances. Continues to follow with ENT, recommended nasal sprays.  The patient has clinical findings consistent with rhinitis and an intranasal steroid and/or an antihistamine may be helpful in controlling symptoms associated with a post-nasal drip and upper airway cough syndrome. I suggested an allergy evaluation to help determine his allergic triggers. Aggressive control of airway inflammation secondary to allergies would help achieve optimal asthma control.   Based on the above assessment, my recommendations are as follows: 1. Take 0.50 mg of budesonide via nebulization twice daily. Rinse mouth out afterwards. 2. Start albuterol, 2-4 puffs via spacer (or 1 vial via nebulization) every 4 - 6 hours as needed for cough, wheeze or difficulty breathing. 3. At the first sign of an illness, start albuterol 2-3x per day and increase as needed as per above. 4. Take 2 puffs of albuterol 15-30 minutes before exercise via a spacer +/- mask only as needed.  5. Start nasal sprays as per ENT evaluation. 6. Continue to follow with Allergy given noted environmental allergies. 7. Return to clinic in 4-6 months, or sooner as needed.  Discussed above assessment and management plan. Parent agreed with plan. All queries were answered.

## 2025-07-20 NOTE — HISTORY OF PRESENT ILLNESS
[FreeTextEntry1] : 7/2025 - Seen by cardiology February '25 on account of a family history of heart disease. Normal exam and ECG. No chest pain. - Seen by ENT July '25 for mild snoring. 2+ tonsils. Family deferred endoscopy. Rec nasal sprays including saline, Flonase and Azelastine. f/u allergy. - Mother feels he needs his albuterol more frequently with exercise. - Seeing PT for speech evaluation. Tongue thruster and working on myopathy. - No recurrent cough noted and overall doing well. - Sleep: +snoring (louder when sick, lighter when well), no witnessed apneas - Remains on budesonide 0.5 mg BID every day - Needed albuterol near end of the school year (mother thinks bc of changes in weather) but hasn't needed it since  2/2025 Interval History: Doing okay. Recent PSG with very mild DAISY - 1.3 events/hr, SpO2 lora 91%, no hypoventilation. +mouth breather? nightly, loud snoring. Reports he constantly sucks his tongue so parents concerned his tongue may be getting in the way. Recent ER visits/hospitalizations: denies Last oral steroid course: denies Recurrent cough or wheeze: denies when well Recurrent nocturnal cough or wheeze: denies when well Activity-induced symptoms: denies Daily meds: budesonide 0.5 BID, albuterol prn Last used rescue: not in a while Will be getting allergy testing in the spring (blood work) - prior history of cat allergy and on zyrtec PRN Going to see a cardiologist next week with brother given FH of murmur.  11/2024 Interval History: Developed a cold end of October and was sent home from school. Seen by PCP and did not have pneumonia. Reportedly had a drop in his oxygen saturation which improved with albuterol. SpO2 was normal at PCP. PCP recommended Flovent every day for one week. - Started Flovent last night for a cough. Recent ER visits/hospitalizations: denies Last oral steroid course: denies Recurrent cough or wheeze: cough some mornings - coughs up mucus Recurrent nocturnal cough or wheeze: few nights per week - described as dry Activity-induced symptoms: denies with pre-treatment with Flovent Daily meds: denies Last used rescue: last week Snores nightly and loud, no witnessed apneas.  8/2024 - initial visit AMBER YORK is a 5 year M presenting for evaluation of asthma. Currently with cough x2-3 days. +congestion. No fevers, no rhinorrhea. Two weeks ago he developed a cough overnight and vomited up mucus and then resolved that night. Previously followed by Dr. Bermudez from Peds Pulmonary at [facility]. Had recurrent cough when he was younger and diagnosed with recurrent croup. [facility] changed insurances so transitioning to [facility]. Seen in the ER when younger for illness and noted to have fluid in lungs. Currently on Flovent 110 2p BID with spacer with pretty good adherence. [facility] states insurance has preferred Asmanex over Fluticasone Propionate HFA (generic Flovent).  Prior hospitalizations: denies Courses of oral corticosteroids: 4-5x for respiratory issues Last ER visit: 6 months ago  Respiratory Symptoms Chronic/Persistent daytime cough: denies when well Chronic/Persistent nighttime cough: denies when well Wheezing: yes Albuterol use: 2-3x per week while in school. Currently only using albuterol before soccer camp. Albuterol response: Good Cough and wheezing responsive to oral corticosteroids: yes Activity limitation: +cough, dyspnea   Modified Asthma Predictive Index 4 or more wheezing episodes/year in the first three years of life: Major risk factors   Parental asthma: Dad   History of eczema: yes   Aeroallergen sensitization: +cat, +?grass - seen previously by allergy, will repeat testing when older Minor risk factors   Food allergies: denies   Wheezing apart from colds:   Eosinophilia > 4%:   Respiratory morbidity History of prior RSV infection: denies History of prior COVID-19 infection: denies History of pneumonia: denies History of sinusitis: denies History of recurrent ear infections: denies Family history of CF, PCD, or other diseases of childhood: denies   Other comorbidities DAISY Symptoms: +snoring, no witnessed apneas GERD: No history of spitting up, regurgitation of feeds, back arching, chest discomfort with feeds. No history of medical treatment for reflux. Dysphagia: No history of choking or gagging with feeds.   Birth history: 39 weeks, NICU for monitoring (small for age, temp instability, colic), no respiratory issues Smokers in household: denies Grade: Going into 1st grade Immunizations: UTD

## 2025-07-20 NOTE — REVIEW OF SYSTEMS
[Nl] : Endocrine [Snoring] : snoring [Eczema] : eczema [Urticaria] : urticaria [Immunizations are up to date] : Immunizations are up to date [Apnea] : no apnea [Sinus Problems] : no sinus problems [Recurrent Ear Infections] : no recurrent ear infections [Wheezing] : no wheezing [Cough] : no cough [Pneumonia] : no pneumonia [de-identified] : see HPI

## 2025-07-20 NOTE — REVIEW OF SYSTEMS
[Nl] : Endocrine [Snoring] : snoring [Eczema] : eczema [Urticaria] : urticaria [Immunizations are up to date] : Immunizations are up to date [Apnea] : no apnea [Sinus Problems] : no sinus problems [Recurrent Ear Infections] : no recurrent ear infections [Wheezing] : no wheezing [Cough] : no cough [Pneumonia] : no pneumonia [de-identified] : see HPI

## 2025-07-20 NOTE — DATA REVIEWED
[FreeTextEntry1] : [id number]. An electrocardiogram performed on the patient on account of the family history reveals a normal sinus rhythm with a normal axis. There is no evidence for chamber enlargement or hypertrophy.

## 2025-07-20 NOTE — IMPRESSION
[Spirometry] : Spirometry [FreeTextEntry1] : Spirometry demonstrates an FVC of 92%, FEV1 of 83%, FEV1/FVC ratio of 81, and an [id number] of 70%. FEFmax 90%.

## 2025-07-20 NOTE — HISTORY OF PRESENT ILLNESS
[FreeTextEntry1] : 7/2025 - Seen by cardiology February '25 on account of a family history of heart disease. Normal exam and ECG. No chest pain. - Seen by ENT July '25 for mild snoring. 2+ tonsils. Family deferred endoscopy. Rec nasal sprays including saline, Flonase and Azelastine. f/u allergy. - Mother feels he needs his albuterol more frequently with exercise. - Seeing PT for speech evaluation. Tongue thruster and working on myopathy. - No recurrent cough noted and overall doing well. - Sleep: +snoring (louder when sick, lighter when well), no witnessed apneas - Remains on budesonide 0.5 mg BID every day - Needed albuterol near end of the school year (mother thinks bc of changes in weather) but hasn't needed it since  2/2025 Interval History: Doing okay. Recent PSG with very mild DAISY - 1.3 events/hr, SpO2 lora 91%, no hypoventilation. +mouth breather? nightly, loud snoring. Reports he constantly sucks his tongue so parents concerned his tongue may be getting in the way. Recent ER visits/hospitalizations: denies Last oral steroid course: denies Recurrent cough or wheeze: denies when well Recurrent nocturnal cough or wheeze: denies when well Activity-induced symptoms: denies Daily meds: budesonide 0.5 BID, albuterol prn Last used rescue: not in a while Will be getting allergy testing in the spring (blood work) - prior history of cat allergy and on zyrtec PRN Going to see a cardiologist next week with brother given FH of murmur.  11/2024 Interval History: Developed a cold end of October and was sent home from school. Seen by PCP and did not have pneumonia. Reportedly had a drop in his oxygen saturation which improved with albuterol. SpO2 was normal at PCP. PCP recommended Flovent every day for one week. - Started Flovent last night for a cough. Recent ER visits/hospitalizations: denies Last oral steroid course: denies Recurrent cough or wheeze: cough some mornings - coughs up mucus Recurrent nocturnal cough or wheeze: few nights per week - described as dry Activity-induced symptoms: denies with pre-treatment with Flovent Daily meds: denies Last used rescue: last week Snores nightly and loud, no witnessed apneas.  8/2024 - initial visit AMBER YORK is a 5 year M presenting for evaluation of asthma. Currently with cough x2-3 days. +congestion. No fevers, no rhinorrhea. Two weeks ago he developed a cough overnight and vomited up mucus and then resolved that night. Previously followed by Dr. Bermudez from Peds Pulmonary at [facility]. Had recurrent cough when he was younger and diagnosed with recurrent croup. [facility] [place] to [facility]. Seen in the ER when younger for illness and noted to have fluid in lungs. Currently on Flovent 110 2p BID with spacer with pretty good adherence. [facility] [place] preferred Asmanex over Fluticasone Propionate HFA (generic Flovent).  Prior hospitalizations: denies Courses of oral corticosteroids: 4-5x for respiratory issues Last ER visit: 6 months ago  Respiratory Symptoms Chronic/Persistent daytime cough: denies when well Chronic/Persistent nighttime cough: denies when well Wheezing: yes Albuterol use: 2-3x per week while in school. Currently only using albuterol before soccer camp. Albuterol response: Good Cough and wheezing responsive to oral corticosteroids: yes Activity limitation: +cough, dyspnea   Modified Asthma Predictive Index 4 or more wheezing episodes/year in the first three years of life: Major risk factors   Parental asthma: Dad   History of eczema: yes   Aeroallergen sensitization: +cat, +?grass - seen previously by allergy, will repeat testing when older Minor risk factors   Food allergies: denies   Wheezing apart from colds:   Eosinophilia > 4%:   Respiratory morbidity History of prior RSV infection: denies History of prior COVID-19 infection: denies History of pneumonia: denies History of sinusitis: denies History of recurrent ear infections: denies Family history of CF, PCD, or other diseases of childhood: denies   Other comorbidities DAISY Symptoms: +snoring, no witnessed apneas GERD: No history of spitting up, regurgitation of feeds, back arching, chest discomfort with feeds. No history of medical treatment for reflux. Dysphagia: No history of choking or gagging with feeds.   Birth history: 39 weeks, NICU for monitoring (small for age, temp instability, colic), no respiratory issues Smokers in household: denies Grade: Going into 1st grade Immunizations: UTD

## 2025-07-20 NOTE — REVIEW OF SYSTEMS
[Nl] : Endocrine [Snoring] : snoring [Eczema] : eczema [Urticaria] : urticaria [Immunizations are up to date] : Immunizations are up to date [Apnea] : no apnea [Sinus Problems] : no sinus problems [Recurrent Ear Infections] : no recurrent ear infections [Wheezing] : no wheezing [Cough] : no cough [Pneumonia] : no pneumonia [de-identified] : see HPI

## 2025-07-20 NOTE — IMPRESSION
[Spirometry] : Spirometry [FreeTextEntry1] : Spirometry demonstrates an FVC of 92%, FEV1 of 83%, FEV1/FVC ratio of 81, and an [id number] of 70%. FEFmax 90%.

## 2025-07-20 NOTE — HISTORY OF PRESENT ILLNESS
[FreeTextEntry1] : 7/2025 - Seen by cardiology February '25 on account of a family history of heart disease. Normal exam and ECG. No chest pain. - Seen by ENT July '25 for mild snoring. 2+ tonsils. Family deferred endoscopy. Rec nasal sprays including saline, Flonase and Azelastine. f/u allergy. - Mother feels he needs his albuterol more frequently with exercise. - Seeing PT for speech evaluation. Tongue thruster and working on myopathy. - No recurrent cough noted and overall doing well. - Sleep: +snoring (louder when sick, lighter when well), no witnessed apneas - Remains on budesonide 0.5 mg BID every day - Needed albuterol near end of the school year (mother thinks bc of changes in weather) but hasn't needed it since  2/2025 Interval History: Doing okay. Recent PSG with very mild DAISY - 1.3 events/hr, SpO2 lora 91%, no hypoventilation. +mouth breather? nightly, loud snoring. Reports he constantly sucks his tongue so parents concerned his tongue may be getting in the way. Recent ER visits/hospitalizations: denies Last oral steroid course: denies Recurrent cough or wheeze: denies when well Recurrent nocturnal cough or wheeze: denies when well Activity-induced symptoms: denies Daily meds: budesonide 0.5 BID, albuterol prn Last used rescue: not in a while Will be getting allergy testing in the spring (blood work) - prior history of cat allergy and on zyrtec PRN Going to see a cardiologist next week with brother given FH of murmur.  11/2024 Interval History: Developed a cold end of October and was sent home from school. Seen by PCP and did not have pneumonia. Reportedly had a drop in his oxygen saturation which improved with albuterol. SpO2 was normal at PCP. PCP recommended Flovent every day for one week. - Started Flovent last night for a cough. Recent ER visits/hospitalizations: denies Last oral steroid course: denies Recurrent cough or wheeze: cough some mornings - coughs up mucus Recurrent nocturnal cough or wheeze: few nights per week - described as dry Activity-induced symptoms: denies with pre-treatment with Flovent Daily meds: denies Last used rescue: last week Snores nightly and loud, no witnessed apneas.  8/2024 - initial visit AMBER YORK is a 5 year M presenting for evaluation of asthma. Currently with cough x2-3 days. +congestion. No fevers, no rhinorrhea. Two weeks ago he developed a cough overnight and vomited up mucus and then resolved that night. Previously followed by Dr. Bermudez from Peds Pulmonary at [facility]. Had recurrent cough when he was younger and diagnosed with recurrent croup[place] [facility] [place] [facility]. Seen in the ER when younger for illness and noted to have fluid in lungs. Currently on Flovent 110 2p BID with spacer with pretty good adherence. [facility] [place] Asmanex over Fluticasone Propionate HFA (generic Flovent).  Prior hospitalizations: denies Courses of oral corticosteroids: 4-5x for respiratory issues Last ER visit: 6 months ago  Respiratory Symptoms Chronic/Persistent daytime cough: denies when well Chronic/Persistent nighttime cough: denies when well Wheezing: yes Albuterol use: 2-3x per week while in school. Currently only using albuterol before soccer camp. Albuterol response: Good Cough and wheezing responsive to oral corticosteroids: yes Activity limitation: +cough, dyspnea   Modified Asthma Predictive Index 4 or more wheezing episodes/year in the first three years of life: Major risk factors   Parental asthma: Dad   History of eczema: yes   Aeroallergen sensitization: +cat, +?grass - seen previously by allergy, will repeat testing when older Minor risk factors   Food allergies: denies   Wheezing apart from colds:   Eosinophilia > 4%:   Respiratory morbidity History of prior RSV infection: denies History of prior COVID-19 infection: denies History of pneumonia: denies History of sinusitis: denies History of recurrent ear infections: denies Family history of CF, PCD, or other diseases of childhood: denies   Other comorbidities DAISY Symptoms: +snoring, no witnessed apneas GERD: No history of spitting up, regurgitation of feeds, back arching, chest discomfort with feeds. No history of medical treatment for reflux. Dysphagia: No history of choking or gagging with feeds.   Birth history: 39 weeks, NICU for monitoring (small for age, temp instability, colic), no respiratory issues Smokers in household: denies Grade: Going into 1st grade Immunizations: UTD

## 2025-07-20 NOTE — CONSULT LETTER
[Dear  ___] : Dear  [unfilled], [Courtesy Letter:] : I had the pleasure of seeing your patient, [unfilled], in my office today. [Please see my note below.] : Please see my note below. [Consult Closing:] : Thank you very much for allowing me to participate in the care of this patient.  If you have any questions, please do not hesitate to contact me. [Sincerely,] : Sincerely, [FreeTextEntry3] : Nas Cartagena MD [facility]

## 2025-07-20 NOTE — ASSESSMENT
[FreeTextEntry1] : AMBER YORK is a 7yo M with previously diagnosed asthma presenting for follow up evaluation. Risk factors for asthma include FH asthma (father), atopic dermatitis, and concerns for aeroallergen sensitization. Previously noted cough and wheeze responsive to bronchodilators and oral corticosteroids. Doing overall well since last visit without recurrent cough or wheeze. History of prior ER visits and several courses of oral corticosteroids, however none since last visit. Currently on budesonide twice daily (previously noted to be the cheapest option as opposed to MDI with spacer, preferred by parents) and tolerating well. Encouraged early initiation of albuterol as needed for symptoms with illness. Lungs are clear in office with good aeration. Spirometry performed and overall normal although with low-normal FEV1/FVC ratio and stepdown decrease in flows. Will continue budesonide at this time - tried to obtain MDI however not covered by insurance.  Patient has history of nightly snoring in addition to tonsillar hypertrophy. Concerns for DAISY in father. Recent sleep study with very mild DAISY (1.3 events/hr, SpO2 lora 91%, no hypoventilation). Parents report loud snoring when sick, improved when well; no witnessed apneas. Reports he constantly sucks his tongue so parents concerned his tongue may be getting in the way. Seeing PT for this. Discussed with sleep medicine team and too young to consider dental appliances. Continues to follow with ENT, recommended nasal sprays.  The patient has clinical findings consistent with rhinitis and an intranasal steroid and/or an antihistamine may be helpful in controlling symptoms associated with a post-nasal drip and upper airway cough syndrome. I suggested an allergy evaluation to help determine his allergic triggers. Aggressive control of airway inflammation secondary to allergies would help achieve optimal asthma control.   Based on the above assessment, my recommendations are as follows: 1. Take 0.50 mg of budesonide via nebulization twice daily. Rinse mouth out afterwards. 2. Start albuterol, 2-4 puffs via spacer (or 1 vial via nebulization) every 4 - 6 hours as needed for cough, wheeze or difficulty breathing. 3. At the first sign of an illness, start albuterol 2-3x per day and increase as needed as per above. 4. Take 2 puffs of albuterol 15-30 minutes before exercise via a spacer +/- mask only as needed.  5. Start nasal sprays as per ENT evaluation. 6. Continue to follow with Allergy given noted environmental allergies. 7. Return to clinic in 4-6 months, or sooner as needed.  Discussed above assessment and management plan. Parent agreed with plan. All queries were answered.